# Patient Record
Sex: FEMALE | Race: BLACK OR AFRICAN AMERICAN | URBAN - METROPOLITAN AREA
[De-identification: names, ages, dates, MRNs, and addresses within clinical notes are randomized per-mention and may not be internally consistent; named-entity substitution may affect disease eponyms.]

---

## 2024-07-23 DIAGNOSIS — I60.7 RUPTURED CEREBRAL ANEURYSM: Primary | ICD-10-CM

## 2024-07-23 DIAGNOSIS — I60.7: ICD-10-CM

## 2024-07-24 ENCOUNTER — TELEPHONE (OUTPATIENT)
Dept: NEUROLOGY | Facility: CLINIC | Age: 39
End: 2024-07-24

## 2024-07-30 ENCOUNTER — TELEPHONE (OUTPATIENT)
Dept: NEUROLOGY | Facility: CLINIC | Age: 39
End: 2024-07-30

## 2024-07-30 NOTE — TELEPHONE ENCOUNTER
Left voicemail with 's nurse to call back regarding images. Imaging may not be here by August 5th for patient appointment.

## 2024-07-30 NOTE — TELEPHONE ENCOUNTER
----- Message from Terrence Arzate MD sent at 7/30/2024 12:18 PM CDT -----  Can you request images from [facility]?

## 2024-08-15 ENCOUNTER — TELEPHONE (OUTPATIENT)
Dept: NEUROLOGY | Facility: CLINIC | Age: 39
End: 2024-08-15
Payer: MEDICAID